# Patient Record
Sex: MALE | URBAN - METROPOLITAN AREA
[De-identification: names, ages, dates, MRNs, and addresses within clinical notes are randomized per-mention and may not be internally consistent; named-entity substitution may affect disease eponyms.]

---

## 2024-02-15 ENCOUNTER — OV CON (OUTPATIENT)
Dept: URBAN - METROPOLITAN AREA CLINIC 80 | Facility: CLINIC | Age: 74
End: 2024-02-15
Payer: COMMERCIAL

## 2024-02-15 VITALS
HEIGHT: 70 IN | TEMPERATURE: 86 F | DIASTOLIC BLOOD PRESSURE: 60 MMHG | SYSTOLIC BLOOD PRESSURE: 121 MMHG | BODY MASS INDEX: 22.05 KG/M2 | WEIGHT: 154 LBS

## 2024-02-15 DIAGNOSIS — R19.5 HEME + STOOL: ICD-10-CM

## 2024-02-15 PROCEDURE — 99203 OFFICE O/P NEW LOW 30 MIN: CPT | Performed by: INTERNAL MEDICINE

## 2024-02-15 RX ORDER — METFORMIN HYDROCHLORIDE 1000 MG/1
TAKE ONE TABLET BY MOUTH TWICE DAILY WITH FOOD IN THE MORNING AND IN THE EVENING TABLET, COATED ORAL
Qty: 142 EACH | Refills: 1 | Status: ACTIVE | COMMUNITY

## 2024-02-15 RX ORDER — LOSARTAN POTASSIUM 50 MG/1
TAKE ONE TABLET BY MOUTH ONCE DAILY TABLET, FILM COATED ORAL
Qty: 90 EACH | Refills: 0 | Status: ACTIVE | COMMUNITY

## 2024-02-15 RX ORDER — AMLODIPINE BESYLATE 10 MG/1
TAKE ONE TABLET BY MOUTH ONCE DAILY TABLET ORAL
Qty: 71 EACH | Refills: 1 | Status: ACTIVE | COMMUNITY

## 2024-02-15 NOTE — PHYSICAL EXAM HENT:
Head, normocephalic, atraumatic, Face, Face within normal limits, Ears, External ears within normal limits Number Of Curettages: 2

## 2024-03-22 ENCOUNTER — LAB (OUTPATIENT)
Dept: URBAN - METROPOLITAN AREA CLINIC 4 | Facility: CLINIC | Age: 74
End: 2024-03-22
Payer: COMMERCIAL

## 2024-03-22 ENCOUNTER — COLON (OUTPATIENT)
Dept: URBAN - METROPOLITAN AREA SURGERY CENTER 19 | Facility: SURGERY CENTER | Age: 74
End: 2024-03-22

## 2024-03-22 DIAGNOSIS — D12.3 BENIGN NEOPLASM OF TRANSVERSE COLON: ICD-10-CM

## 2024-03-22 PROCEDURE — 88305 TISSUE EXAM BY PATHOLOGIST: CPT | Performed by: PATHOLOGY

## 2024-03-22 RX ORDER — AMLODIPINE BESYLATE 10 MG/1
TAKE ONE TABLET BY MOUTH ONCE DAILY TABLET ORAL
Qty: 71 EACH | Refills: 1 | Status: ACTIVE | COMMUNITY

## 2024-03-22 RX ORDER — METFORMIN HYDROCHLORIDE 1000 MG/1
TAKE ONE TABLET BY MOUTH TWICE DAILY WITH FOOD IN THE MORNING AND IN THE EVENING TABLET, COATED ORAL
Qty: 142 EACH | Refills: 1 | Status: ACTIVE | COMMUNITY

## 2024-03-22 RX ORDER — LOSARTAN POTASSIUM 50 MG/1
TAKE ONE TABLET BY MOUTH ONCE DAILY TABLET, FILM COATED ORAL
Qty: 90 EACH | Refills: 0 | Status: ACTIVE | COMMUNITY